# Patient Record
Sex: MALE | Race: BLACK OR AFRICAN AMERICAN | NOT HISPANIC OR LATINO | Employment: UNEMPLOYED | URBAN - METROPOLITAN AREA
[De-identification: names, ages, dates, MRNs, and addresses within clinical notes are randomized per-mention and may not be internally consistent; named-entity substitution may affect disease eponyms.]

---

## 2019-08-21 ENCOUNTER — TELEPHONE (OUTPATIENT)
Dept: PAIN MEDICINE | Facility: CLINIC | Age: 54
End: 2019-08-21

## 2019-08-21 NOTE — TELEPHONE ENCOUNTER
----- Message from Roya Lancaster sent at 8/21/2019  1:22 PM CDT -----  Contact: MICHAEL CURRIE JR. [[id number]]   Name of Who is Calling : MICHAEL CURRIE JR. [[id number]]    What is the request in detail :     Patient is requesting a call from staff in regards to scheduling an appointment for gout , ankle spurs and arthritis     ..... Please contact to further discuss and advise.    Can the clinic reply by [facility] :  yes    What Number to Call Back : [phone]